# Patient Record
Sex: MALE | Race: WHITE | NOT HISPANIC OR LATINO
[De-identification: names, ages, dates, MRNs, and addresses within clinical notes are randomized per-mention and may not be internally consistent; named-entity substitution may affect disease eponyms.]

---

## 2021-06-09 ENCOUNTER — NON-APPOINTMENT (OUTPATIENT)
Age: 46
End: 2021-06-09

## 2021-06-09 PROBLEM — Z00.00 ENCOUNTER FOR PREVENTIVE HEALTH EXAMINATION: Status: ACTIVE | Noted: 2021-06-09

## 2021-06-10 ENCOUNTER — APPOINTMENT (OUTPATIENT)
Dept: INFECTIOUS DISEASE | Facility: CLINIC | Age: 46
End: 2021-06-10
Payer: MEDICAID

## 2021-06-10 ENCOUNTER — NON-APPOINTMENT (OUTPATIENT)
Age: 46
End: 2021-06-10

## 2021-06-10 ENCOUNTER — OUTPATIENT (OUTPATIENT)
Dept: OUTPATIENT SERVICES | Facility: HOSPITAL | Age: 46
LOS: 1 days | End: 2021-06-10

## 2021-06-10 VITALS
RESPIRATION RATE: 12 BRPM | WEIGHT: 142.5 LBS | SYSTOLIC BLOOD PRESSURE: 125 MMHG | BODY MASS INDEX: 19.3 KG/M2 | HEIGHT: 72 IN | OXYGEN SATURATION: 97 % | DIASTOLIC BLOOD PRESSURE: 89 MMHG | TEMPERATURE: 97.6 F | HEART RATE: 77 BPM

## 2021-06-10 DIAGNOSIS — Z86.69 PERSONAL HISTORY OF OTHER DISEASES OF THE NERVOUS SYSTEM AND SENSE ORGANS: ICD-10-CM

## 2021-06-10 DIAGNOSIS — Z87.39 PERSONAL HISTORY OF OTHER DISEASES OF THE MUSCULOSKELETAL SYSTEM AND CONNECTIVE TISSUE: ICD-10-CM

## 2021-06-10 DIAGNOSIS — F17.200 NICOTINE DEPENDENCE, UNSPECIFIED, UNCOMPLICATED: ICD-10-CM

## 2021-06-10 DIAGNOSIS — L84 CORNS AND CALLOSITIES: ICD-10-CM

## 2021-06-10 DIAGNOSIS — D69.6 THROMBOCYTOPENIA, UNSPECIFIED: ICD-10-CM

## 2021-06-10 DIAGNOSIS — G25.0 ESSENTIAL TREMOR: ICD-10-CM

## 2021-06-10 DIAGNOSIS — J45.909 UNSPECIFIED ASTHMA, UNCOMPLICATED: ICD-10-CM

## 2021-06-10 LAB
A1C WITH ESTIMATED AVERAGE GLUCOSE RESULT: 4.8 % — SIGNIFICANT CHANGE UP (ref 4–5.6)
ALBUMIN SERPL ELPH-MCNC: 4.3 G/DL — SIGNIFICANT CHANGE UP (ref 3.3–5)
ALP SERPL-CCNC: 130 U/L — HIGH (ref 40–120)
ALT FLD-CCNC: 214 U/L — HIGH (ref 10–45)
ANION GAP SERPL CALC-SCNC: 14 MMOL/L — SIGNIFICANT CHANGE UP (ref 5–17)
APPEARANCE UR: CLEAR — SIGNIFICANT CHANGE UP
AST SERPL-CCNC: 184 U/L — HIGH (ref 10–40)
BILIRUB SERPL-MCNC: 1 MG/DL — SIGNIFICANT CHANGE UP (ref 0.2–1.2)
BILIRUB UR-MCNC: ABNORMAL
BUN SERPL-MCNC: 19 MG/DL — SIGNIFICANT CHANGE UP (ref 7–23)
CALCIUM SERPL-MCNC: 9.9 MG/DL — SIGNIFICANT CHANGE UP (ref 8.4–10.5)
CHLORIDE SERPL-SCNC: 104 MMOL/L — SIGNIFICANT CHANGE UP (ref 96–108)
CHOLEST SERPL-MCNC: 163 MG/DL — SIGNIFICANT CHANGE UP
CO2 SERPL-SCNC: 24 MMOL/L — SIGNIFICANT CHANGE UP (ref 22–31)
COLOR SPEC: YELLOW — SIGNIFICANT CHANGE UP
CONTROL LINE: PRESENT
CREAT SERPL-MCNC: 0.69 MG/DL — SIGNIFICANT CHANGE UP (ref 0.5–1.3)
DIFF PNL FLD: NEGATIVE — SIGNIFICANT CHANGE UP
ESTIMATED AVERAGE GLUCOSE: 91 MG/DL — SIGNIFICANT CHANGE UP (ref 68–114)
GLUCOSE SERPL-MCNC: 88 MG/DL — SIGNIFICANT CHANGE UP (ref 70–99)
GLUCOSE UR QL: NEGATIVE — SIGNIFICANT CHANGE UP
HAV IGM SER-ACNC: SIGNIFICANT CHANGE UP
HBV CORE IGM SER-ACNC: SIGNIFICANT CHANGE UP
HBV SURFACE AG SER-ACNC: SIGNIFICANT CHANGE UP
HCT VFR BLD CALC: 45.4 % — SIGNIFICANT CHANGE UP (ref 39–50)
HCT VFR BLD CALC: 45.4 % — SIGNIFICANT CHANGE UP (ref 39–50)
HCV AB S/CO SERPL IA: 0.03 S/CO — SIGNIFICANT CHANGE UP
HCV AB SERPL-IMP: SIGNIFICANT CHANGE UP
HDLC SERPL-MCNC: 50 MG/DL — SIGNIFICANT CHANGE UP
HGB BLD-MCNC: 14.7 G/DL — SIGNIFICANT CHANGE UP (ref 13–17)
HGB BLD-MCNC: 15 G/DL — SIGNIFICANT CHANGE UP (ref 13–17)
HIV 1+2 AB+HIV1 P24 AG SERPL QL IA: SIGNIFICANT CHANGE UP
HIV 1+2 AB+HIV1P24 AG SERPLBLD IA.RAPID: NEGATIVE
HIV-1 / HIV-2 ANTIBODY: NORMAL
HIV1 P24 AG SERPL IA-MCNC: NEGATIVE
KETONES UR-MCNC: ABNORMAL MG/DL
LEUKOCYTE ESTERASE UR-ACNC: NEGATIVE — SIGNIFICANT CHANGE UP
LIPID PNL WITH DIRECT LDL SERPL: 97 MG/DL — SIGNIFICANT CHANGE UP
MCHC RBC-ENTMCNC: 32.4 GM/DL — SIGNIFICANT CHANGE UP (ref 32–36)
MCHC RBC-ENTMCNC: 33 GM/DL — SIGNIFICANT CHANGE UP (ref 32–36)
MCHC RBC-ENTMCNC: 33.3 PG — SIGNIFICANT CHANGE UP (ref 27–34)
MCHC RBC-ENTMCNC: 34.2 PG — HIGH (ref 27–34)
MCV RBC AUTO: 102.9 FL — HIGH (ref 80–100)
MCV RBC AUTO: 103.4 FL — HIGH (ref 80–100)
NITRITE UR-MCNC: NEGATIVE — SIGNIFICANT CHANGE UP
NON HDL CHOLESTEROL: 113 MG/DL — SIGNIFICANT CHANGE UP
NRBC # BLD: 0 /100 WBCS — SIGNIFICANT CHANGE UP (ref 0–0)
NRBC # BLD: 0 /100 WBCS — SIGNIFICANT CHANGE UP (ref 0–0)
PH UR: 5.5 — SIGNIFICANT CHANGE UP (ref 5–8)
PLATELET # BLD AUTO: 299 K/UL — SIGNIFICANT CHANGE UP (ref 150–400)
PLATELET # BLD AUTO: 311 K/UL — SIGNIFICANT CHANGE UP (ref 150–400)
POTASSIUM SERPL-MCNC: 4.5 MMOL/L — SIGNIFICANT CHANGE UP (ref 3.5–5.3)
POTASSIUM SERPL-SCNC: 4.5 MMOL/L — SIGNIFICANT CHANGE UP (ref 3.5–5.3)
PROT SERPL-MCNC: 7.7 G/DL — SIGNIFICANT CHANGE UP (ref 6–8.3)
PROT UR-MCNC: NEGATIVE MG/DL — SIGNIFICANT CHANGE UP
RBC # BLD: 4.39 M/UL — SIGNIFICANT CHANGE UP (ref 4.2–5.8)
RBC # BLD: 4.41 M/UL — SIGNIFICANT CHANGE UP (ref 4.2–5.8)
RBC # FLD: 12.8 % — SIGNIFICANT CHANGE UP (ref 10.3–14.5)
RBC # FLD: 12.8 % — SIGNIFICANT CHANGE UP (ref 10.3–14.5)
SODIUM SERPL-SCNC: 142 MMOL/L — SIGNIFICANT CHANGE UP (ref 135–145)
SP GR SPEC: >=1.03 — SIGNIFICANT CHANGE UP (ref 1–1.03)
TRIGL SERPL-MCNC: 82 MG/DL — SIGNIFICANT CHANGE UP
TSH SERPL-MCNC: 3.05 UIU/ML — SIGNIFICANT CHANGE UP (ref 0.27–4.2)
UROBILINOGEN FLD QL: 0.2 E.U./DL — SIGNIFICANT CHANGE UP
WBC # BLD: 8.14 K/UL — SIGNIFICANT CHANGE UP (ref 3.8–10.5)
WBC # BLD: 8.24 K/UL — SIGNIFICANT CHANGE UP (ref 3.8–10.5)
WBC # FLD AUTO: 8.14 K/UL — SIGNIFICANT CHANGE UP (ref 3.8–10.5)
WBC # FLD AUTO: 8.24 K/UL — SIGNIFICANT CHANGE UP (ref 3.8–10.5)

## 2021-06-10 PROCEDURE — 99204 OFFICE O/P NEW MOD 45 MIN: CPT | Mod: GC,25

## 2021-06-10 PROCEDURE — 99386 PREV VISIT NEW AGE 40-64: CPT | Mod: GC

## 2021-06-10 RX ORDER — MAGNESIUM OXIDE/MAG AA CHELATE 300 MG
300 CAPSULE ORAL
Refills: 0 | Status: ACTIVE | COMMUNITY
Start: 2021-06-10

## 2021-06-10 RX ORDER — CYANOCOBALAMIN 1000 UG/ML
1000 INJECTION INTRAMUSCULAR; SUBCUTANEOUS
Qty: 0 | Refills: 0 | Status: COMPLETED | OUTPATIENT
Start: 2021-06-10

## 2021-06-10 RX ADMIN — Medication 0 MCG/ML: at 00:00

## 2021-06-11 ENCOUNTER — NON-APPOINTMENT (OUTPATIENT)
Age: 46
End: 2021-06-11

## 2021-06-11 PROBLEM — Z87.39 HISTORY OF RIGHT FOOT DROP: Status: RESOLVED | Noted: 2021-06-10 | Resolved: 2021-06-11

## 2021-06-11 LAB
C TRACH RRNA SPEC QL NAA+PROBE: SIGNIFICANT CHANGE UP
COVID-19 NUCLEOCAPSID GAM AB INTERP: NEGATIVE — SIGNIFICANT CHANGE UP
COVID-19 NUCLEOCAPSID TOTAL GAM ANTIBODY RESULT: 0.11 INDEX — SIGNIFICANT CHANGE UP
COVID-19 SPIKE DOMAIN AB INTERP: POSITIVE
COVID-19 SPIKE DOMAIN ANTIBODY RESULT: 122 U/ML — HIGH
FOLATE SERPL-MCNC: >20 NG/ML — SIGNIFICANT CHANGE UP
N GONORRHOEA RRNA SPEC QL NAA+PROBE: SIGNIFICANT CHANGE UP
SARS-COV-2 IGG+IGM SERPL QL IA: 0.11 INDEX — SIGNIFICANT CHANGE UP
SARS-COV-2 IGG+IGM SERPL QL IA: 122 U/ML — HIGH
SARS-COV-2 IGG+IGM SERPL QL IA: NEGATIVE — SIGNIFICANT CHANGE UP
SARS-COV-2 IGG+IGM SERPL QL IA: POSITIVE
T PALLIDUM AB TITR SER: NEGATIVE — SIGNIFICANT CHANGE UP
VIT B12 SERPL-MCNC: 1768 PG/ML — HIGH (ref 232–1245)

## 2021-06-12 LAB
GAMMA INTERFERON BACKGROUND BLD IA-ACNC: 0.07 IU/ML — SIGNIFICANT CHANGE UP
M TB IFN-G BLD-IMP: NEGATIVE — SIGNIFICANT CHANGE UP
M TB IFN-G CD4+ BCKGRND COR BLD-ACNC: -0.01 IU/ML — SIGNIFICANT CHANGE UP
M TB IFN-G CD4+CD8+ BCKGRND COR BLD-ACNC: -0.01 IU/ML — SIGNIFICANT CHANGE UP
QUANT TB PLUS MITOGEN MINUS NIL: 7.44 IU/ML — SIGNIFICANT CHANGE UP

## 2021-06-14 DIAGNOSIS — G25.0 ESSENTIAL TREMOR: ICD-10-CM

## 2021-06-14 DIAGNOSIS — R74.01 ELEVATION OF LEVELS OF LIVER TRANSAMINASE LEVELS: ICD-10-CM

## 2021-06-14 DIAGNOSIS — Z11.4 ENCOUNTER FOR SCREENING FOR HUMAN IMMUNODEFICIENCY VIRUS [HIV]: ICD-10-CM

## 2021-06-14 DIAGNOSIS — Z00.00 ENCOUNTER FOR GENERAL ADULT MEDICAL EXAMINATION WITHOUT ABNORMAL FINDINGS: ICD-10-CM

## 2021-06-14 DIAGNOSIS — M21.371 FOOT DROP, RIGHT FOOT: ICD-10-CM

## 2021-06-14 DIAGNOSIS — Z72.52 HIGH RISK HOMOSEXUAL BEHAVIOR: ICD-10-CM

## 2021-06-14 DIAGNOSIS — F10.10 ALCOHOL ABUSE, UNCOMPLICATED: ICD-10-CM

## 2021-06-14 DIAGNOSIS — J45.909 UNSPECIFIED ASTHMA, UNCOMPLICATED: ICD-10-CM

## 2021-06-14 DIAGNOSIS — D69.6 THROMBOCYTOPENIA, UNSPECIFIED: ICD-10-CM

## 2021-06-14 DIAGNOSIS — M21.611 BUNION OF RIGHT FOOT: ICD-10-CM

## 2021-06-14 DIAGNOSIS — H52.10 MYOPIA, UNSPECIFIED EYE: ICD-10-CM

## 2021-06-22 ENCOUNTER — APPOINTMENT (OUTPATIENT)
Dept: NEUROLOGY | Facility: CLINIC | Age: 46
End: 2021-06-22

## 2021-06-24 ENCOUNTER — APPOINTMENT (OUTPATIENT)
Dept: INFECTIOUS DISEASE | Facility: CLINIC | Age: 46
End: 2021-06-24
Payer: MEDICAID

## 2021-06-24 ENCOUNTER — TRANSCRIPTION ENCOUNTER (OUTPATIENT)
Age: 46
End: 2021-06-24

## 2021-06-24 ENCOUNTER — OUTPATIENT (OUTPATIENT)
Dept: OUTPATIENT SERVICES | Facility: HOSPITAL | Age: 46
LOS: 1 days | End: 2021-06-24

## 2021-06-24 VITALS
HEIGHT: 72 IN | HEART RATE: 78 BPM | WEIGHT: 143.2 LBS | BODY MASS INDEX: 19.39 KG/M2 | SYSTOLIC BLOOD PRESSURE: 110 MMHG | OXYGEN SATURATION: 96 % | DIASTOLIC BLOOD PRESSURE: 74 MMHG

## 2021-06-24 DIAGNOSIS — Z92.29 PERSONAL HISTORY OF OTHER DRUG THERAPY: ICD-10-CM

## 2021-06-24 DIAGNOSIS — H52.10 MYOPIA, UNSPECIFIED EYE: ICD-10-CM

## 2021-06-24 LAB
ALBUMIN SERPL ELPH-MCNC: 4.6 G/DL — SIGNIFICANT CHANGE UP (ref 3.3–5)
ALP SERPL-CCNC: 97 U/L — SIGNIFICANT CHANGE UP (ref 40–120)
ALT FLD-CCNC: 53 U/L — HIGH (ref 10–45)
ANION GAP SERPL CALC-SCNC: 15 MMOL/L — SIGNIFICANT CHANGE UP (ref 5–17)
AST SERPL-CCNC: 41 U/L — HIGH (ref 10–40)
BASOPHILS # BLD AUTO: 0.1 K/UL — SIGNIFICANT CHANGE UP (ref 0–0.2)
BASOPHILS NFR BLD AUTO: 1.3 % — SIGNIFICANT CHANGE UP (ref 0–2)
BILIRUB SERPL-MCNC: 1.2 MG/DL — SIGNIFICANT CHANGE UP (ref 0.2–1.2)
BUN SERPL-MCNC: 18 MG/DL — SIGNIFICANT CHANGE UP (ref 7–23)
CALCIUM SERPL-MCNC: 9.4 MG/DL — SIGNIFICANT CHANGE UP (ref 8.4–10.5)
CHLORIDE SERPL-SCNC: 101 MMOL/L — SIGNIFICANT CHANGE UP (ref 96–108)
CO2 SERPL-SCNC: 23 MMOL/L — SIGNIFICANT CHANGE UP (ref 22–31)
CREAT SERPL-MCNC: 0.69 MG/DL — SIGNIFICANT CHANGE UP (ref 0.5–1.3)
EOSINOPHIL # BLD AUTO: 0.34 K/UL — SIGNIFICANT CHANGE UP (ref 0–0.5)
EOSINOPHIL NFR BLD AUTO: 4.3 % — SIGNIFICANT CHANGE UP (ref 0–6)
GLUCOSE SERPL-MCNC: 73 MG/DL — SIGNIFICANT CHANGE UP (ref 70–99)
HCT VFR BLD CALC: 43.6 % — SIGNIFICANT CHANGE UP (ref 39–50)
HGB BLD-MCNC: 14.2 G/DL — SIGNIFICANT CHANGE UP (ref 13–17)
IMM GRANULOCYTES NFR BLD AUTO: 0.3 % — SIGNIFICANT CHANGE UP (ref 0–1.5)
LYMPHOCYTES # BLD AUTO: 3 K/UL — SIGNIFICANT CHANGE UP (ref 1–3.3)
LYMPHOCYTES # BLD AUTO: 37.7 % — SIGNIFICANT CHANGE UP (ref 13–44)
MCHC RBC-ENTMCNC: 32.6 GM/DL — SIGNIFICANT CHANGE UP (ref 32–36)
MCHC RBC-ENTMCNC: 33.5 PG — SIGNIFICANT CHANGE UP (ref 27–34)
MCV RBC AUTO: 102.8 FL — HIGH (ref 80–100)
MONOCYTES # BLD AUTO: 0.69 K/UL — SIGNIFICANT CHANGE UP (ref 0–0.9)
MONOCYTES NFR BLD AUTO: 8.7 % — SIGNIFICANT CHANGE UP (ref 2–14)
NEUTROPHILS # BLD AUTO: 3.81 K/UL — SIGNIFICANT CHANGE UP (ref 1.8–7.4)
NEUTROPHILS NFR BLD AUTO: 47.7 % — SIGNIFICANT CHANGE UP (ref 43–77)
NRBC # BLD: 0 /100 WBCS — SIGNIFICANT CHANGE UP (ref 0–0)
PLATELET # BLD AUTO: 301 K/UL — SIGNIFICANT CHANGE UP (ref 150–400)
POTASSIUM SERPL-MCNC: 4.8 MMOL/L — SIGNIFICANT CHANGE UP (ref 3.5–5.3)
POTASSIUM SERPL-SCNC: 4.8 MMOL/L — SIGNIFICANT CHANGE UP (ref 3.5–5.3)
PROT SERPL-MCNC: 7.6 G/DL — SIGNIFICANT CHANGE UP (ref 6–8.3)
RBC # BLD: 4.24 M/UL — SIGNIFICANT CHANGE UP (ref 4.2–5.8)
RBC # FLD: 12 % — SIGNIFICANT CHANGE UP (ref 10.3–14.5)
SODIUM SERPL-SCNC: 139 MMOL/L — SIGNIFICANT CHANGE UP (ref 135–145)
WBC # BLD: 7.96 K/UL — SIGNIFICANT CHANGE UP (ref 3.8–10.5)
WBC # FLD AUTO: 7.96 K/UL — SIGNIFICANT CHANGE UP (ref 3.8–10.5)

## 2021-06-24 PROCEDURE — 99214 OFFICE O/P EST MOD 30 MIN: CPT | Mod: GC

## 2021-06-24 RX ORDER — NICOTINE 21 MG/24HR
21 PATCH, TRANSDERMAL 24 HOURS TRANSDERMAL DAILY
Qty: 14 | Refills: 3 | Status: DISCONTINUED | COMMUNITY
Start: 2021-06-10 | End: 2021-06-24

## 2021-06-24 RX ORDER — NICOTINE POLACRILEX 4 MG/1
4 GUM, CHEWING ORAL
Qty: 14 | Refills: 2 | Status: DISCONTINUED | COMMUNITY
Start: 2021-06-10 | End: 2021-06-24

## 2021-06-25 LAB
HAV IGG SER QL IA: SIGNIFICANT CHANGE UP
HAV IGM SER-ACNC: SIGNIFICANT CHANGE UP
HBV SURFACE AB SER-ACNC: REACTIVE

## 2021-06-25 NOTE — HISTORY OF PRESENT ILLNESS
[FreeTextEntry1] : prep followup and foot drop [de-identified] : 47 y/o M PMH etoh abuse (sober x 3 weeks), active smoker (slightly less than 1 ppd), MSM (started on descovy for prep at last visit), asthma, essential tremor who presents for followup. Patient was requested to come back today as he was noted to have elevated lfts at last visit which were slightly increased from when he was in the [facility] ER. Patient has no ruq pain and has not been drinking. He is attending AA via zoom. He has not had any sexual encounters since started descovy. Reports no side effects from descovy. Still smoking about 1 ppd but has cut down slightly. Would like to focus on sobriety before tackling his smoking. Patient has noted some improvement in his foot drop and can know lift his foot off the ground. Patient has not yet seen ophtho (f/u on b/l staphyloma), podiatry (f/u on bunion and callus,, or neurology (f/u on foot drop).

## 2021-06-25 NOTE — REVIEW OF SYSTEMS
[Negative] : Integumentary [Headache] : no headache [Dizziness] : no dizziness [Confusion] : no confusion [de-identified] : +right foot drop

## 2021-06-25 NOTE — ASSESSMENT
[FreeTextEntry1] : #HCM - eligible for tdap vaccine but would like to defer for now\par \par RTC in 2.5 months (3 months after starting descovy 6/10/21)

## 2021-06-25 NOTE — PHYSICAL EXAM
[Supple] : supple [Soft] : abdomen soft [Non Tender] : non-tender [Non-distended] : non-distended [No Masses] : no abdominal mass palpated [No HSM] : no HSM [Normal] : affect was normal and insight and judgment were intact [de-identified] : +right foot dorsiflexion 3/5, no other motor or sensory deficits noted, CN 2-12 intact

## 2021-06-25 NOTE — END OF VISIT
[] : Resident [FreeTextEntry3] : I saw and evaluated the patient with resident. I reviewed the laboratory and radiologic data and reviewed the notes. 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

## 2021-06-28 DIAGNOSIS — R10.10 UPPER ABDOMINAL PAIN, UNSPECIFIED: ICD-10-CM

## 2021-06-28 DIAGNOSIS — Z72.52 HIGH RISK HOMOSEXUAL BEHAVIOR: ICD-10-CM

## 2021-06-28 DIAGNOSIS — M21.371 FOOT DROP, RIGHT FOOT: ICD-10-CM

## 2021-06-28 DIAGNOSIS — R74.01 ELEVATION OF LEVELS OF LIVER TRANSAMINASE LEVELS: ICD-10-CM

## 2021-06-28 DIAGNOSIS — H52.10 MYOPIA, UNSPECIFIED EYE: ICD-10-CM

## 2021-06-28 LAB
HCV RNA SERPL NAA DL=5-ACNC: SIGNIFICANT CHANGE UP IU/ML
HCV RNA SPEC NAA+PROBE-LOG IU: SIGNIFICANT CHANGE UP LOG10IU/ML

## 2021-07-08 ENCOUNTER — RX RENEWAL (OUTPATIENT)
Age: 46
End: 2021-07-08

## 2021-07-15 ENCOUNTER — APPOINTMENT (OUTPATIENT)
Dept: INFECTIOUS DISEASE | Facility: CLINIC | Age: 46
End: 2021-07-15

## 2021-08-12 ENCOUNTER — APPOINTMENT (OUTPATIENT)
Dept: NEUROLOGY | Facility: CLINIC | Age: 46
End: 2021-08-12
Payer: MEDICAID

## 2021-08-12 VITALS
HEIGHT: 72 IN | BODY MASS INDEX: 19.23 KG/M2 | OXYGEN SATURATION: 94 % | HEART RATE: 82 BPM | SYSTOLIC BLOOD PRESSURE: 115 MMHG | DIASTOLIC BLOOD PRESSURE: 77 MMHG | TEMPERATURE: 96.6 F | WEIGHT: 142 LBS

## 2021-08-12 PROCEDURE — 99205 OFFICE O/P NEW HI 60 MIN: CPT

## 2021-08-12 NOTE — DISCUSSION/SUMMARY
[FreeTextEntry1] : I will send him for nerve conduction studies and EMG.  I doubt that he has a radicular origin for his injury but I would like to establish the certainty of his peroneal nerve injury.\par I will also send him for physical therapy.\par He is already taking vitamins and he is eating normal.  His B12 folate etc. were all normal according to his GP.\par Discussed possible etiologies including alcohol which may have precipitated a injury due to compression.

## 2021-08-12 NOTE — PROCEDURE
[FreeTextEntry1] : 46-year-old man with a history of alcohol abuse who presents with a 2-month history of right foot drop.  The examination is consistent with possible peroneal nerve injury.  He has primarily dorsiflexor weakness.  He reports minor sensory changes along the distribution of the peroneal nerve.

## 2021-08-12 NOTE — PHYSICAL EXAM
[Place] : oriented to place [Time] : oriented to time [Naming Objects] : no difficulty naming common objects [Repeating Phrases] : no difficulty repeating a phrase [Fluency] : fluency intact [Comprehension] : comprehension intact [Current Events] : adequate knowledge of current events [Cranial Nerves Optic (II)] : visual acuity intact bilaterally,  visual fields full to confrontation, pupils equal round and reactive to light [Cranial Nerves Oculomotor (III)] : extraocular motion intact [Cranial Nerves Vestibulocochlear (VIII)] : hearing was intact bilaterally [Motor Handedness Right-Handed] : the patient is right hand dominant [Motor Strength Shoulders Right Weakness] : normal shoulder strength [Motor Strength Upper Extremities Right] : normal arm strength [Motor Strength Forearms Right Weakness] : normal forearm strength [Motor Strength Wrists Right Weakness] : normal wrist strength [Hand Weakness Right] : normal hand  [Motor Strength Fingers Right Hand Weakness] : normal finger strength [Motor Strength Thumb Right Weakness] : normal thumb strength [Motor Strength Shoulders Left Weakness] : normal shoulder strength [Motor Strength Upper Extremities Left] : normal arm strength [Motor Strength Forearms Left Weakness] : normal forearm strength [Motor Strength Wrists Left Weakness] : normal wrist strength [Hand Weakness Left] : normal hand  [Motor Strength Fingers Left Hand Weakness] : normal finger strength [Motor Strength Thumb Left Weakness] : normal thumb strength [Motor Strength Hips Right Weakness] : normal hip strength [Motor Strength Knee Right Weakness] : normal knee strength [Motor Strength Ankle Right Weakness] : ankle weakness was present [5] : toe extension 5/5 [3] : great toe extension 3/5 [Motor Strength Hips Left Weakness] : normal hip strength [Motor Strength Knee Left Weakness] : normal knee strength [Motor Strength Ankle Left Weakness] : normal ankle strength [Motor Strength Toes Left Foot Weakness] : normal toe strength [Motor Strength First Toe Left Weakness] : normal great toe strength [Limited Balance] : the patient's balance was impaired [2+] : Ankle jerk left 2+ [FreeTextEntry7] : light sensory asymm w R food ext

## 2021-08-12 NOTE — HISTORY OF PRESENT ILLNESS
[FreeTextEntry1] : This is the first visit of this 46-year-old white right-handed man who was referred by GP for right foot drop.\par \par The patient past medical history is characterized by alcohol abuse, asthma and previous hospitalizations for alcohol abuse.  He also has a history of mild no or essential tremor.\par Please see chart for details regarding his other medical conditions.\par \par The patient reports that he woke up one day around June 2021 with the inability to move his right foot.  At first he reports that he could not move the toes and had a major limp.  Over the past month however his symptoms have improved to the point that he cannot walk with impairment.  He denies clear sensory changes associated with his weakness but reports that this sensation on the outer part of the left right foot is may be decreased compared to the left.\par He has no back pain or urinary incontinence.  He reports no injuries to the pelvis or to any other areas in the past.\par He has a history of alcohol abuse drinking about a pint of vodka a day but he quit this year.  He reports that he is not drinking anymore.\par He has completed his vaccines back in the spring.\par He reports no pain but reports that sometimes he has some tingling and numbness of his feet.  He reports no symptoms in his upper extremities.

## 2021-09-15 ENCOUNTER — NON-APPOINTMENT (OUTPATIENT)
Age: 46
End: 2021-09-15

## 2021-09-20 ENCOUNTER — NON-APPOINTMENT (OUTPATIENT)
Age: 46
End: 2021-09-20

## 2021-09-21 ENCOUNTER — OUTPATIENT (OUTPATIENT)
Dept: OUTPATIENT SERVICES | Facility: HOSPITAL | Age: 46
LOS: 1 days | End: 2021-09-21

## 2021-09-21 ENCOUNTER — APPOINTMENT (OUTPATIENT)
Dept: INFECTIOUS DISEASE | Facility: CLINIC | Age: 46
End: 2021-09-21
Payer: MEDICAID

## 2021-09-21 VITALS
WEIGHT: 143.6 LBS | SYSTOLIC BLOOD PRESSURE: 108 MMHG | DIASTOLIC BLOOD PRESSURE: 78 MMHG | RESPIRATION RATE: 14 BRPM | BODY MASS INDEX: 19.48 KG/M2 | OXYGEN SATURATION: 99 % | HEART RATE: 92 BPM | TEMPERATURE: 97.8 F

## 2021-09-21 DIAGNOSIS — Z00.00 ENCOUNTER FOR GENERAL ADULT MEDICAL EXAMINATION W/OUT ABNORMAL FINDINGS: ICD-10-CM

## 2021-09-21 LAB
CONTROL LINE: PRESENT
HIV 1+2 AB+HIV1P24 AG SERPLBLD IA.RAPID: NEGATIVE
HIV-1 / HIV-2 ANTIBODY: NORMAL
HIV1 P24 AG SERPL IA-MCNC: NEGATIVE

## 2021-09-21 PROCEDURE — 99214 OFFICE O/P EST MOD 30 MIN: CPT | Mod: GC

## 2021-09-22 NOTE — HISTORY OF PRESENT ILLNESS
[Post-hospitalization from ___ Hospital] : Post-hospitalization from [unfilled] Hospital [FreeTextEntry2] : 46M with PMH of EtOH abuse, MSM (on Descovy), asthma, essential tremor presents for f/u after hospital discharge. He reports binge drinking recently before trying to quit cold turkey. He started to have tremors and visual hallucinations at home. He was admitted to [facility] on 9/14 and discharged after 4 days. He was discharged on Naltrexone PO which he has been taking since yesterday. He also has joined an outpatient rehabilitation program through [facility] and has been attending sessions with a counselor.He is motivated to quit. He has not consumed alcohol since being discharged and reports his foot drop is also resolved. He ran out of [employer] and has missed approximately 1 week of pills. He has not been sexually active in the past month. He does report some increased anxiety with racing thoughts at night. He feels his anxiety makes his essential tremors worse. He denies panic attacks, fevers, chills.

## 2021-09-22 NOTE — PHYSICAL EXAM
[No Lymphadenopathy] : no lymphadenopathy [Speech Grossly Normal] : speech grossly normal [Normal] : no posterior cervical lymphadenopathy and no anterior cervical lymphadenopathy [Memory Grossly Normal] : memory grossly normal [de-identified] : mild resting and essential tremor present in hands

## 2021-09-22 NOTE — END OF VISIT
[] : Resident [FreeTextEntry3] : I saw and evaluated the patient with resident. I reviewed the laboratory and radiologic data and reviewed the notes. \par I performed a medication reconciliation and  reviewed vaccination history and other health care maintenance and prevention topics in the EMR.\par I assessed patient's adherence to medications especially ARVs ( % 100 ) and inquired about AEs ( None).\par \par \par \par FIT testing\par \par Continue PrEP\par POC HIV test negative

## 2021-09-22 NOTE — ASSESSMENT
[FreeTextEntry1] : 46M with PMH of EtOH abuse, MSM (on Descovy), asthma, essential tremor presents for f/u after recent hospital admission for alcohol withdrawal.\par \par #EtOH abuse - pt reports not drinking since discharge. He is currently on Naltrexone and in an outpatient rehab.\par - c/w Naltrexone PO\par - c/w outpatient rehab and counseling \par - will recheck CMP on following visit\par - Pt will fax/email records from [facility]\par \par #MSM on PrEP - Pt says he has not been sexually active for the past month\par - POCT HIV test negative in office today 9/21\par - Pt educated about safe sex practices while restarting Descovy\par - c/w Descovy\par - RTC in 3 months for further testing\par - Previous triple site test and syphilis was negative, hepatitis panel negative\par \par #Anxiety\par - Pt advised to f/u with counselor at outpatient rehab program he is currently in\par - Pt educated about meditation and guided meditation apps to decrease racing thoughts\par \par #HCM\par - Pt given FIT test to complete \par - Pt due for Tdap but he will do it during next visit\par - Pt due for flu vaccine\par - COVID vaccine UTD\par \par RTC in 3 months

## 2021-09-27 DIAGNOSIS — Z79.899 OTHER LONG TERM (CURRENT) DRUG THERAPY: ICD-10-CM

## 2021-09-27 DIAGNOSIS — Z12.11 ENCOUNTER FOR SCREENING FOR MALIGNANT NEOPLASM OF COLON: ICD-10-CM

## 2021-09-27 DIAGNOSIS — F10.10 ALCOHOL ABUSE, UNCOMPLICATED: ICD-10-CM

## 2021-09-27 DIAGNOSIS — F41.9 ANXIETY DISORDER, UNSPECIFIED: ICD-10-CM

## 2021-10-04 ENCOUNTER — NON-APPOINTMENT (OUTPATIENT)
Age: 46
End: 2021-10-04

## 2021-10-04 ENCOUNTER — RX RENEWAL (OUTPATIENT)
Age: 46
End: 2021-10-04

## 2021-10-07 ENCOUNTER — OUTPATIENT (OUTPATIENT)
Dept: OUTPATIENT SERVICES | Facility: HOSPITAL | Age: 46
LOS: 1 days | End: 2021-10-07

## 2021-10-07 ENCOUNTER — APPOINTMENT (OUTPATIENT)
Dept: INFECTIOUS DISEASE | Facility: CLINIC | Age: 46
End: 2021-10-07

## 2021-10-07 RX ORDER — ASPIRIN 81 MG/1
81 TABLET ORAL
Refills: 0 | Status: DISCONTINUED | COMMUNITY
Start: 2021-06-10 | End: 2021-10-07

## 2021-10-12 ENCOUNTER — APPOINTMENT (OUTPATIENT)
Dept: INFECTIOUS DISEASE | Facility: CLINIC | Age: 46
End: 2021-10-12
Payer: MEDICAID

## 2021-10-12 ENCOUNTER — OUTPATIENT (OUTPATIENT)
Dept: OUTPATIENT SERVICES | Facility: HOSPITAL | Age: 46
LOS: 1 days | End: 2021-10-12

## 2021-10-12 VITALS
RESPIRATION RATE: 13 BRPM | TEMPERATURE: 98.6 F | HEIGHT: 72 IN | BODY MASS INDEX: 20.09 KG/M2 | WEIGHT: 148.3 LBS | HEART RATE: 75 BPM | DIASTOLIC BLOOD PRESSURE: 75 MMHG | OXYGEN SATURATION: 98 % | SYSTOLIC BLOOD PRESSURE: 110 MMHG

## 2021-10-12 DIAGNOSIS — R74.01 ELEVATION OF LEVELS OF LIVER TRANSAMINASE LEVELS: ICD-10-CM

## 2021-10-12 DIAGNOSIS — Z12.11 ENCOUNTER FOR SCREENING FOR MALIGNANT NEOPLASM OF COLON: ICD-10-CM

## 2021-10-12 LAB
ALBUMIN SERPL ELPH-MCNC: 4.8 G/DL — SIGNIFICANT CHANGE UP (ref 3.3–5)
ALP SERPL-CCNC: 91 U/L — SIGNIFICANT CHANGE UP (ref 40–120)
ALT FLD-CCNC: 34 U/L — SIGNIFICANT CHANGE UP (ref 10–45)
ANION GAP SERPL CALC-SCNC: 10 MMOL/L — SIGNIFICANT CHANGE UP (ref 5–17)
AST SERPL-CCNC: 26 U/L — SIGNIFICANT CHANGE UP (ref 10–40)
BILIRUB SERPL-MCNC: 1 MG/DL — SIGNIFICANT CHANGE UP (ref 0.2–1.2)
BUN SERPL-MCNC: 19 MG/DL — SIGNIFICANT CHANGE UP (ref 7–23)
CALCIUM SERPL-MCNC: 10.3 MG/DL — SIGNIFICANT CHANGE UP (ref 8.4–10.5)
CHLORIDE SERPL-SCNC: 103 MMOL/L — SIGNIFICANT CHANGE UP (ref 96–108)
CK SERPL-CCNC: 70 U/L — SIGNIFICANT CHANGE UP (ref 30–200)
CO2 SERPL-SCNC: 28 MMOL/L — SIGNIFICANT CHANGE UP (ref 22–31)
CREAT SERPL-MCNC: 0.71 MG/DL — SIGNIFICANT CHANGE UP (ref 0.5–1.3)
GLUCOSE SERPL-MCNC: 84 MG/DL — SIGNIFICANT CHANGE UP (ref 70–99)
POTASSIUM SERPL-MCNC: 4.8 MMOL/L — SIGNIFICANT CHANGE UP (ref 3.5–5.3)
POTASSIUM SERPL-SCNC: 4.8 MMOL/L — SIGNIFICANT CHANGE UP (ref 3.5–5.3)
PROT SERPL-MCNC: 8.1 G/DL — SIGNIFICANT CHANGE UP (ref 6–8.3)
SODIUM SERPL-SCNC: 141 MMOL/L — SIGNIFICANT CHANGE UP (ref 135–145)

## 2021-10-12 PROCEDURE — 99214 OFFICE O/P EST MOD 30 MIN: CPT | Mod: GC

## 2021-10-14 DIAGNOSIS — Z12.11 ENCOUNTER FOR SCREENING FOR MALIGNANT NEOPLASM OF COLON: ICD-10-CM

## 2021-10-14 DIAGNOSIS — Z23 ENCOUNTER FOR IMMUNIZATION: ICD-10-CM

## 2021-10-14 DIAGNOSIS — M21.611 BUNION OF RIGHT FOOT: ICD-10-CM

## 2021-10-14 DIAGNOSIS — F10.10 ALCOHOL ABUSE, UNCOMPLICATED: ICD-10-CM

## 2021-10-14 DIAGNOSIS — R74.01 ELEVATION OF LEVELS OF LIVER TRANSAMINASE LEVELS: ICD-10-CM

## 2021-10-15 ENCOUNTER — TRANSCRIPTION ENCOUNTER (OUTPATIENT)
Age: 46
End: 2021-10-15

## 2021-10-22 ENCOUNTER — RX RENEWAL (OUTPATIENT)
Age: 46
End: 2021-10-22

## 2021-11-18 ENCOUNTER — APPOINTMENT (OUTPATIENT)
Dept: NEUROLOGY | Facility: CLINIC | Age: 46
End: 2021-11-18
Payer: MEDICAID

## 2021-11-18 VITALS
TEMPERATURE: 95.5 F | WEIGHT: 145 LBS | HEART RATE: 99 BPM | BODY MASS INDEX: 19.64 KG/M2 | DIASTOLIC BLOOD PRESSURE: 75 MMHG | SYSTOLIC BLOOD PRESSURE: 109 MMHG | HEIGHT: 72 IN | OXYGEN SATURATION: 99 %

## 2021-11-18 DIAGNOSIS — M21.371 FOOT DROP, RIGHT FOOT: ICD-10-CM

## 2021-11-18 PROCEDURE — 95911 NRV CNDJ TEST 9-10 STUDIES: CPT

## 2021-11-18 PROCEDURE — 99214 OFFICE O/P EST MOD 30 MIN: CPT | Mod: 25

## 2021-11-18 PROCEDURE — 95885 MUSC TST DONE W/NERV TST LIM: CPT

## 2021-11-18 NOTE — PROCEDURE
[FreeTextEntry1] : \par Nerve Conduction and Electromyography Report\par  [FreeTextEntry3] : \par Electro Physiologic Findings:\par \par Limb temperature was monitored and maintained at approximately 30 – 34° C in the lower extremities.\par \par The right sural sensory response was low amplitude, with normal velocity. The superficial fibular sensory responses were absent bilaterally. The right fibular motor response at the EDB was low amplitude, with mild slowing in the lower leg and moderate slowing across the knee, without conduction block. The right fibular motor response at the tibialis anterior was normal amplitude, with moderate slowing across the knee and a suggestion of mild (30%) conduction block. The left fibular motor response at the EDB had a borderline amplitude with normal velocity; at the tibialis anterior it was normal. The tibial motor amplitudes were mildly low bilaterally with normal latencies. The tibial and fibular F-wave latencies were prolonged bilaterally. \par \par Needle electromyography was performed on the right tibialis anterior, which demonstrated mild chronic denervation; and the right peroneus longus, which was unremarkable, although evaluation was limited by poor patient tolerance. \par \par Clinical Electrophysiological Impression: \par \par This electrodiagnostic study demonstrated a chronic right common fibular (peroneal) neuropathy, with possible mild conduction block and mild distal motor axon loss. \par \par There was also evidence of a mild, predominantly axonal sensorimotor polyneuropathy, which, given the history provided, is likely related to alcohol use and/or nutritional deficiencies.

## 2021-11-18 NOTE — PHYSICAL EXAM
[FreeTextEntry1] : Neurological Examination: \par Motor examination:  Upper Extremities: L 5/5, R 5/5; Lower extremities (toe extension): L 4/5, R 4/5, otherwise intact.  Mild atrophy of right EDB\par Sensory examination:   Moderate decrease in vibration at toes, mildly decreased at lateral maleolus. Length dependent decrease to pinprick.\par Reflexes:   2+ b/l biceps, triceps, brachioradialis, patella. 1+ left achilles, absent right achilles.

## 2021-11-18 NOTE — HISTORY OF PRESENT ILLNESS
[FreeTextEntry1] : Patient was referred by Dr. Connor for evaluation of right foot drop.\par Symptoms started in June 2021 after spending a prolonged period down on his right side after falling down (in the setting of alcohol intoxication). \par \par Today he reports that the strength in his right dorsiflexion has improved dramatically (90% of baseline), but now he reports some mild numbness in the medial aspects of both LEs. He says he has stopped drinking heavily and believes the resolution of his foot drop can be attributed to that. He is unsure how long he has had the numbness, but believes onset occurred after the onset of foot drop. \par \par Reviewed:\par discharge summary from [facility]\par notes from neurology, infectious disease / internal medicine\par labs - MCV elevated 110, AST and ALT elevated

## 2021-11-18 NOTE — ASSESSMENT
[FreeTextEntry1] : Right foot drop due to common fibular (peroneal) neuropathy due to prolonged compression in the setting of alcohol intoxication, now largely recovered \par NCS/EMG demonstrated a right common fibular neuropathy, now chronic with reinnervation changes\par Also a mild axonal sensorimotor polyneuropathy likely due to alcohol and/or nutritional deficiencies\par He is currently taking thiamine, folic acid, and vitamin B12; levels were not reported in the outside records available for review, however I would recommend that he continue these supplements for the time being\par \par See separate procedure note for full results of NCS/EMG study

## 2021-11-18 NOTE — HISTORY OF PRESENT ILLNESS
[FreeTextEntry1] : Patient was referred by Dr. Connor for evaluation of right foot drop.\par Symptoms started in June 2021 after spending a prolonged period down on his right side after falling down (in the setting of alcohol intoxication). \par \par Today he reports that the strength in his right dorsiflexion has improved dramatically (90% of baseline), but now he reports some mild numbness in the medial aspects of both LEs. He says he has stopped drinking heavily and believes the resolution of his foot drop can be attributed to that. He is unsure how long he has had the numbness, but believes onset occurred after the onset of foot drop. \par \par Reviewed:\par discharge summary from [facility]\par notes from neurology, infectious disease / internal medicine\par labs - MCV elevated 110, AST and ALT elevated

## 2021-12-13 ENCOUNTER — NON-APPOINTMENT (OUTPATIENT)
Age: 46
End: 2021-12-13

## 2021-12-21 ENCOUNTER — OUTPATIENT (OUTPATIENT)
Dept: OUTPATIENT SERVICES | Facility: HOSPITAL | Age: 46
LOS: 1 days | End: 2021-12-21

## 2021-12-21 ENCOUNTER — APPOINTMENT (OUTPATIENT)
Dept: INFECTIOUS DISEASE | Facility: CLINIC | Age: 46
End: 2021-12-21
Payer: MEDICAID

## 2021-12-21 VITALS
HEART RATE: 90 BPM | OXYGEN SATURATION: 96 % | HEIGHT: 72 IN | DIASTOLIC BLOOD PRESSURE: 77 MMHG | TEMPERATURE: 98.5 F | WEIGHT: 150.06 LBS | BODY MASS INDEX: 20.33 KG/M2 | RESPIRATION RATE: 16 BRPM | SYSTOLIC BLOOD PRESSURE: 126 MMHG

## 2021-12-21 LAB
ALBUMIN SERPL ELPH-MCNC: 4.8 G/DL — SIGNIFICANT CHANGE UP (ref 3.3–5)
ALP SERPL-CCNC: 83 U/L — SIGNIFICANT CHANGE UP (ref 40–120)
ALT FLD-CCNC: 27 U/L — SIGNIFICANT CHANGE UP (ref 10–45)
ANION GAP SERPL CALC-SCNC: 12 MMOL/L — SIGNIFICANT CHANGE UP (ref 5–17)
AST SERPL-CCNC: 27 U/L — SIGNIFICANT CHANGE UP (ref 10–40)
BASOPHILS # BLD AUTO: 0.08 K/UL — SIGNIFICANT CHANGE UP (ref 0–0.2)
BASOPHILS NFR BLD AUTO: 1.4 % — SIGNIFICANT CHANGE UP (ref 0–2)
BILIRUB SERPL-MCNC: 0.3 MG/DL — SIGNIFICANT CHANGE UP (ref 0.2–1.2)
BUN SERPL-MCNC: 13 MG/DL — SIGNIFICANT CHANGE UP (ref 7–23)
CALCIUM SERPL-MCNC: 9.8 MG/DL — SIGNIFICANT CHANGE UP (ref 8.4–10.5)
CHLORIDE SERPL-SCNC: 102 MMOL/L — SIGNIFICANT CHANGE UP (ref 96–108)
CO2 SERPL-SCNC: 25 MMOL/L — SIGNIFICANT CHANGE UP (ref 22–31)
CREAT SERPL-MCNC: 0.85 MG/DL — SIGNIFICANT CHANGE UP (ref 0.5–1.3)
EOSINOPHIL # BLD AUTO: 0.22 K/UL — SIGNIFICANT CHANGE UP (ref 0–0.5)
EOSINOPHIL NFR BLD AUTO: 3.7 % — SIGNIFICANT CHANGE UP (ref 0–6)
GLUCOSE SERPL-MCNC: 79 MG/DL — SIGNIFICANT CHANGE UP (ref 70–99)
HCT VFR BLD CALC: 42.2 % — SIGNIFICANT CHANGE UP (ref 39–50)
HGB BLD-MCNC: 14.8 G/DL — SIGNIFICANT CHANGE UP (ref 13–17)
IMM GRANULOCYTES NFR BLD AUTO: 0.2 % — SIGNIFICANT CHANGE UP (ref 0–1.5)
LYMPHOCYTES # BLD AUTO: 2.47 K/UL — SIGNIFICANT CHANGE UP (ref 1–3.3)
LYMPHOCYTES # BLD AUTO: 42 % — SIGNIFICANT CHANGE UP (ref 13–44)
MCHC RBC-ENTMCNC: 35.1 GM/DL — SIGNIFICANT CHANGE UP (ref 32–36)
MCHC RBC-ENTMCNC: 35.7 PG — HIGH (ref 27–34)
MCV RBC AUTO: 101.9 FL — HIGH (ref 80–100)
MONOCYTES # BLD AUTO: 0.6 K/UL — SIGNIFICANT CHANGE UP (ref 0–0.9)
MONOCYTES NFR BLD AUTO: 10.2 % — SIGNIFICANT CHANGE UP (ref 2–14)
NEUTROPHILS # BLD AUTO: 2.5 K/UL — SIGNIFICANT CHANGE UP (ref 1.8–7.4)
NEUTROPHILS NFR BLD AUTO: 42.5 % — LOW (ref 43–77)
NRBC # BLD: 0 /100 WBCS — SIGNIFICANT CHANGE UP (ref 0–0)
PLATELET # BLD AUTO: 339 K/UL — SIGNIFICANT CHANGE UP (ref 150–400)
POTASSIUM SERPL-MCNC: 4.4 MMOL/L — SIGNIFICANT CHANGE UP (ref 3.5–5.3)
POTASSIUM SERPL-SCNC: 4.4 MMOL/L — SIGNIFICANT CHANGE UP (ref 3.5–5.3)
PROT SERPL-MCNC: 7.6 G/DL — SIGNIFICANT CHANGE UP (ref 6–8.3)
RBC # BLD: 4.14 M/UL — LOW (ref 4.2–5.8)
RBC # FLD: 12.8 % — SIGNIFICANT CHANGE UP (ref 10.3–14.5)
SODIUM SERPL-SCNC: 139 MMOL/L — SIGNIFICANT CHANGE UP (ref 135–145)
WBC # BLD: 5.88 K/UL — SIGNIFICANT CHANGE UP (ref 3.8–10.5)
WBC # FLD AUTO: 5.88 K/UL — SIGNIFICANT CHANGE UP (ref 3.8–10.5)

## 2021-12-21 PROCEDURE — 99495 TRANSJ CARE MGMT MOD F2F 14D: CPT | Mod: GC

## 2021-12-22 LAB
4/8 RATIO: 3.24 RATIO — SIGNIFICANT CHANGE UP (ref 0.9–3.6)
ABS CD8: 387 /UL — SIGNIFICANT CHANGE UP (ref 142–740)
CD3 BLASTS SPEC-ACNC: 1667 /UL — SIGNIFICANT CHANGE UP (ref 672–1870)
CD3 BLASTS SPEC-ACNC: 76 % — SIGNIFICANT CHANGE UP (ref 59–83)
CD4 %: 57 % — SIGNIFICANT CHANGE UP (ref 30–62)
CD8 %: 18 % — SIGNIFICANT CHANGE UP (ref 12–36)
T PALLIDUM AB TITR SER: NEGATIVE — SIGNIFICANT CHANGE UP
T-CELL CD4 SUBSET PNL BLD: 1254 /UL — SIGNIFICANT CHANGE UP (ref 489–1457)

## 2021-12-23 LAB
HIV-1 VIRAL LOAD RESULT: SIGNIFICANT CHANGE UP
HIV1 RNA # SERPL NAA+PROBE: SIGNIFICANT CHANGE UP COPIES/ML
HIV1 RNA SER-IMP: SIGNIFICANT CHANGE UP
HIV1 RNA SERPL NAA+PROBE-ACNC: SIGNIFICANT CHANGE UP
HIV1 RNA SERPL NAA+PROBE-LOG#: SIGNIFICANT CHANGE UP LG COP/ML

## 2021-12-24 LAB — OB PNL STL IA: NEGATIVE — SIGNIFICANT CHANGE UP

## 2021-12-28 DIAGNOSIS — F10.10 ALCOHOL ABUSE, UNCOMPLICATED: ICD-10-CM

## 2021-12-28 DIAGNOSIS — Z79.899 OTHER LONG TERM (CURRENT) DRUG THERAPY: ICD-10-CM

## 2022-03-11 ENCOUNTER — NON-APPOINTMENT (OUTPATIENT)
Age: 47
End: 2022-03-11

## 2022-03-11 ENCOUNTER — RX RENEWAL (OUTPATIENT)
Age: 47
End: 2022-03-11

## 2022-03-21 ENCOUNTER — NON-APPOINTMENT (OUTPATIENT)
Age: 47
End: 2022-03-21

## 2022-03-22 ENCOUNTER — APPOINTMENT (OUTPATIENT)
Dept: INFECTIOUS DISEASE | Facility: CLINIC | Age: 47
End: 2022-03-22

## 2022-04-05 ENCOUNTER — OUTPATIENT (OUTPATIENT)
Dept: OUTPATIENT SERVICES | Facility: HOSPITAL | Age: 47
LOS: 1 days | End: 2022-04-05

## 2022-04-05 ENCOUNTER — APPOINTMENT (OUTPATIENT)
Dept: INFECTIOUS DISEASE | Facility: CLINIC | Age: 47
End: 2022-04-05
Payer: MEDICAID

## 2022-04-05 VITALS
OXYGEN SATURATION: 96 % | RESPIRATION RATE: 14 BRPM | SYSTOLIC BLOOD PRESSURE: 106 MMHG | BODY MASS INDEX: 19.64 KG/M2 | HEIGHT: 72 IN | WEIGHT: 145 LBS | HEART RATE: 82 BPM | DIASTOLIC BLOOD PRESSURE: 72 MMHG

## 2022-04-05 DIAGNOSIS — F41.9 ANXIETY DISORDER, UNSPECIFIED: ICD-10-CM

## 2022-04-05 DIAGNOSIS — F32.A ANXIETY DISORDER, UNSPECIFIED: ICD-10-CM

## 2022-04-05 DIAGNOSIS — Z23 ENCOUNTER FOR IMMUNIZATION: ICD-10-CM

## 2022-04-05 LAB — NONINV COLON CA DNA+OCC BLD SCRN STL QL: NEGATIVE

## 2022-04-05 PROCEDURE — 99215 OFFICE O/P EST HI 40 MIN: CPT | Mod: GC

## 2022-04-05 RX ORDER — FERROUS SULFATE 325(65) MG
325 (65 FE) TABLET ORAL
Refills: 0 | Status: COMPLETED | COMMUNITY
Start: 2021-06-10 | End: 2022-04-05

## 2022-04-05 NOTE — PHYSICAL EXAM
[No Lymphadenopathy] : no lymphadenopathy [Supple] : supple [No Abdominal Bruit] : a ~M bruit was not heard ~T in the abdomen [No Edema] : there was no peripheral edema [No Extremity Clubbing/Cyanosis] : no extremity clubbing/cyanosis [Normal] : soft, non-tender, non-distended, no masses palpated, no HSM and normal bowel sounds [No Spinal Tenderness] : no spinal tenderness [No Focal Deficits] : no focal deficits [Normal Insight/Judgement] : insight and judgment were intact

## 2022-04-06 PROBLEM — Z23 ENCOUNTER FOR IMMUNIZATION: Status: ACTIVE | Noted: 2021-10-12

## 2022-04-06 NOTE — ASSESSMENT
[FreeTextEntry1] : #Anxiety & Depression - patient w/ known hx of anxiety, now meeting criteria for moderate/severe depression. Patient not utilizing therapy via detox program but is amenable to initiating therapy. Given severity of symptoms and failure to improve w/o medication patient is amenable to SSRI therapy as well. \par - start lexapro 5mg PO qd - patient counseled re side effects and need to maintain therapy for at least 6 weeks before notable improvement in depressive symptoms will be appreciated\par - referral to psychiatry\par \par #EtOH abuse - On naltrexone via [facility] program. patient reports drinkning twice since last discahrge in Dec, reports to be performing well on naltrexone (last drink 2 weeks ago). AST/ALT 27/27, plt WNL, \par - c/w Naltrexone PO per o/p \par - c/w outpatient rehab and counseling \par - Pt signed HIPPA paperwork to be able to get the medical record from [facility] during last visit, however, our office has not received any such labwork. \par - HBV vaccinated (sAb +), HCV negative 06/2021\par - administer HAV today\par \par #Neuropathy - presented to [facility] with severe foot drop which improved s/p B12 + folate + thiamine administration. Patient B12 & folate levels WNL however will continue medications i/s/o improvement of neuropathy. \par - c/w thiamine, folate, & B12\par \par #MSM on PrEP - Patient has had 1 sexual partner in the last 6 months always uses condoms. POCT HIV test negative in office today 4/5/2022 \par - c/w Descovy - patient is a good candidate for IM depot formulation but would rath not undergo an injection preferring to remain on PO medication\par - RTC in 3 months for further testing\par - Previous triple site test and syphilis was negative, hepatitis panel negative 12/2021\par \par #Essential tremor\par -C/w Propranolol 20mg qd\par \par #Pressure ulcer of foot - present on the lateral aspect of the L foot first digit w/ significant granulation tissue (2cm in diameter). R foot first digit newer lesion 3cm in diameter serosanguinous drainage. Patient sees podiatrist regularly, ulcers thought to be 2/2 pressure associated to foot carry while ambulating. Upcoming appt this week. Unlikely diabetic in nature given patient last A1c 6/2021 5.3 w/o symptoms of increased thirst or urinary frequency. \par - c/t monitor\par \par #tachycardia - RESOLVED\par \par #HCM\par - Pt did FIT test NEGATIVE 11/24/2021\par - Pt received Tdap 12/21/2021\par - HCV negative 06/2021 (should test annually given alcohol use) \par - received HAV today\par - COVID vaccine pFizer 4/19/21, 5/10/21, 11/19/21 (45yo does not qualify for fourth dose) \par \par RTC in 3 months. \par

## 2022-04-06 NOTE — HISTORY OF PRESENT ILLNESS
[FreeTextEntry1] : f/u for PrEP [de-identified] : Pt is 45 yo MSM PMHx of alcohol abuse disorder, asthma, essential hand tremor since childhood, recent admissions at [facility] on September and also on December for alcohol withdrawal (12/11/2021- 12/13/2021) discharged on Librium 10mg and nicotine 7mg/24hrs at the time, p/f f/u i/s/o PrEP therapy. He denies HA, dizziness, nausea, vomiting, skin tactile, vision/auditory hallucination, urinary, abdominal problem, SOB, chest pain. He reports mild b/l hand tremors, which is chronic and his baseline improve with albuterol. On naltrexone via [facility] program. patient reports drinkning twice since last discahrge in Dec, reports to be performing well on naltrexone (last drink 2 weeks ago). Patient lives in apartment with nephew, reports to feel safe in home. Works as a reality TV director, able to perform all ADLs. Patient is a current smoker (1 pack per day) 22 pack/yr Hx. Patient has had 1 sexual partner in the last 6 months always uses condoms.

## 2022-04-06 NOTE — END OF VISIT
[] : Resident [FreeTextEntry3] : I saw and evaluated the patient with resident. I reviewed the laboratory and radiologic data and reviewed the notes. \par I performed a medication reconciliation and  reviewed vaccination history and other health care maintenance and prevention topics in the EMR.\par I assessed patient's adherence to medications especially PrEP ( % 100 ) and inquired about AEs ( None).\par \par Start Lexapro- CSW visit today to evaluate MH needs\par POC HIV test done negative\par No need for STI testing today as has been low risk\par RTC in 3 months\par Injectable PrEP discussed- not interested at this point\par Hep A vaccination series\par \par

## 2022-04-06 NOTE — HEALTH RISK ASSESSMENT
[Current] : Current [20 or more] : 20 or more [Yes] : Yes [Monthly or less (1 pt)] : Monthly or less (1 point) [0] : 1) Little interest or pleasure doing things: Not at all (0) [3] : 2) Feeling down, depressed, or hopeless for nearly every day (3) [PHQ-2 Positive] : PHQ-2 Positive [Nearly Every Day (3)] : 5.) Poor appetite or overeating? Nearly every day [1/2 of Days or More (2)] : 7.) Trouble concentrating on things, such as reading a newspaper or watching television? Half the days or more [Not at All (0)] : 8.) Moving or speaking so slowly that other people could have noticed, or the opposite, moving or speaking faster than usual? Not at all [Moderately Severe] : severity of depression is moderately severe [Somewhat Difficult] : How difficult have these problems made it for you to do your work, take care of things at home, or get along with people? Somewhat difficult [[id number]] : 3

## 2022-04-07 DIAGNOSIS — F10.10 ALCOHOL ABUSE, UNCOMPLICATED: ICD-10-CM

## 2022-04-07 DIAGNOSIS — F41.9 ANXIETY DISORDER, UNSPECIFIED: ICD-10-CM

## 2022-04-07 DIAGNOSIS — F32.A DEPRESSION, UNSPECIFIED: ICD-10-CM

## 2022-04-07 DIAGNOSIS — Z79.899 OTHER LONG TERM (CURRENT) DRUG THERAPY: ICD-10-CM

## 2022-04-07 DIAGNOSIS — Z23 ENCOUNTER FOR IMMUNIZATION: ICD-10-CM

## 2022-05-06 ENCOUNTER — RX RENEWAL (OUTPATIENT)
Age: 47
End: 2022-05-06

## 2022-05-06 RX ORDER — ALBUTEROL SULFATE 90 UG/1
108 (90 BASE) INHALANT RESPIRATORY (INHALATION)
Qty: 1 | Refills: 2 | Status: ACTIVE | COMMUNITY
Start: 2021-06-16

## 2022-05-06 RX ORDER — ERGOCALCIFEROL (VITAMIN D2) 10 MCG
TABLET ORAL
Qty: 30 | Refills: 5 | Status: ACTIVE | COMMUNITY
Start: 2021-06-10

## 2022-06-28 ENCOUNTER — RX RENEWAL (OUTPATIENT)
Age: 47
End: 2022-06-28

## 2022-07-07 ENCOUNTER — APPOINTMENT (OUTPATIENT)
Dept: INFECTIOUS DISEASE | Facility: CLINIC | Age: 47
End: 2022-07-07

## 2022-07-07 ENCOUNTER — OUTPATIENT (OUTPATIENT)
Dept: OUTPATIENT SERVICES | Facility: HOSPITAL | Age: 47
LOS: 1 days | End: 2022-07-07

## 2022-07-07 ENCOUNTER — RX RENEWAL (OUTPATIENT)
Age: 47
End: 2022-07-07

## 2022-07-07 VITALS
BODY MASS INDEX: 18.99 KG/M2 | HEART RATE: 70 BPM | WEIGHT: 140 LBS | RESPIRATION RATE: 14 BRPM | DIASTOLIC BLOOD PRESSURE: 80 MMHG | SYSTOLIC BLOOD PRESSURE: 120 MMHG

## 2022-07-07 DIAGNOSIS — F10.10 ALCOHOL ABUSE, UNCOMPLICATED: ICD-10-CM

## 2022-07-07 DIAGNOSIS — Z72.52 HIGH RISK HOMOSEXUAL BEHAVIOR: ICD-10-CM

## 2022-07-07 DIAGNOSIS — G62.9 POLYNEUROPATHY, UNSPECIFIED: ICD-10-CM

## 2022-07-07 DIAGNOSIS — M21.611 BUNION OF RIGHT FOOT: ICD-10-CM

## 2022-07-07 DIAGNOSIS — Z79.899 OTHER LONG TERM (CURRENT) DRUG THERAPY: ICD-10-CM

## 2022-07-07 LAB
24R-OH-CALCIDIOL SERPL-MCNC: 35.1 NG/ML — SIGNIFICANT CHANGE UP (ref 30–80)
ALBUMIN SERPL ELPH-MCNC: 3.8 G/DL — SIGNIFICANT CHANGE UP (ref 3.3–5)
ALP SERPL-CCNC: 259 U/L — HIGH (ref 40–120)
ALT FLD-CCNC: 88 U/L — HIGH (ref 10–45)
ANION GAP SERPL CALC-SCNC: 18 MMOL/L — HIGH (ref 5–17)
ANISOCYTOSIS BLD QL: SLIGHT — SIGNIFICANT CHANGE UP
APTT BLD: 32.7 SEC — SIGNIFICANT CHANGE UP (ref 27.5–35.5)
AST SERPL-CCNC: 210 U/L — HIGH (ref 10–40)
BASOPHILS # BLD AUTO: 0.16 K/UL — SIGNIFICANT CHANGE UP (ref 0–0.2)
BASOPHILS NFR BLD AUTO: 2.6 % — HIGH (ref 0–2)
BILIRUB SERPL-MCNC: 0.8 MG/DL — SIGNIFICANT CHANGE UP (ref 0.2–1.2)
BUN SERPL-MCNC: 13 MG/DL — SIGNIFICANT CHANGE UP (ref 7–23)
CALCIUM SERPL-MCNC: 8.7 MG/DL — SIGNIFICANT CHANGE UP (ref 8.4–10.5)
CHLORIDE SERPL-SCNC: 101 MMOL/L — SIGNIFICANT CHANGE UP (ref 96–108)
CO2 SERPL-SCNC: 24 MMOL/L — SIGNIFICANT CHANGE UP (ref 22–31)
CONTROL LINE: PRESENT
CREAT SERPL-MCNC: 0.52 MG/DL — SIGNIFICANT CHANGE UP (ref 0.5–1.3)
DACRYOCYTES BLD QL SMEAR: SLIGHT — SIGNIFICANT CHANGE UP
EGFR: 124 ML/MIN/1.73M2 — SIGNIFICANT CHANGE UP
EOSINOPHIL # BLD AUTO: 0.16 K/UL — SIGNIFICANT CHANGE UP (ref 0–0.5)
EOSINOPHIL NFR BLD AUTO: 2.6 % — SIGNIFICANT CHANGE UP (ref 0–6)
FOLATE SERPL-MCNC: >20 NG/ML — SIGNIFICANT CHANGE UP
GIANT PLATELETS BLD QL SMEAR: PRESENT — SIGNIFICANT CHANGE UP
GLUCOSE SERPL-MCNC: 94 MG/DL — SIGNIFICANT CHANGE UP (ref 70–99)
HCT VFR BLD CALC: 41.7 % — SIGNIFICANT CHANGE UP (ref 39–50)
HGB BLD-MCNC: 14.6 G/DL — SIGNIFICANT CHANGE UP (ref 13–17)
HIV 1+2 AB+HIV1P24 AG SERPLBLD IA.RAPID: NEGATIVE
HIV-1 / HIV-2 ANTIBODY: NORMAL
HIV1 P24 AG SERPL IA-MCNC: NORMAL
INR BLD: 0.93 — SIGNIFICANT CHANGE UP (ref 0.88–1.16)
LYMPHOCYTES # BLD AUTO: 3.11 K/UL — SIGNIFICANT CHANGE UP (ref 1–3.3)
LYMPHOCYTES # BLD AUTO: 51.3 % — HIGH (ref 13–44)
MACROCYTES BLD QL: SLIGHT — SIGNIFICANT CHANGE UP
MANUAL SMEAR VERIFICATION: SIGNIFICANT CHANGE UP
MCHC RBC-ENTMCNC: 35 GM/DL — SIGNIFICANT CHANGE UP (ref 32–36)
MCHC RBC-ENTMCNC: 37.5 PG — HIGH (ref 27–34)
MCV RBC AUTO: 107.2 FL — HIGH (ref 80–100)
MONOCYTES # BLD AUTO: 0.42 K/UL — SIGNIFICANT CHANGE UP (ref 0–0.9)
MONOCYTES NFR BLD AUTO: 7 % — SIGNIFICANT CHANGE UP (ref 2–14)
NEUTROPHILS # BLD AUTO: 2.21 K/UL — SIGNIFICANT CHANGE UP (ref 1.8–7.4)
NEUTROPHILS NFR BLD AUTO: 36.5 % — LOW (ref 43–77)
OVALOCYTES BLD QL SMEAR: SLIGHT — SIGNIFICANT CHANGE UP
PLAT MORPH BLD: ABNORMAL
PLATELET # BLD AUTO: 209 K/UL — SIGNIFICANT CHANGE UP (ref 150–400)
POLYCHROMASIA BLD QL SMEAR: SLIGHT — SIGNIFICANT CHANGE UP
POTASSIUM SERPL-MCNC: 3.8 MMOL/L — SIGNIFICANT CHANGE UP (ref 3.5–5.3)
POTASSIUM SERPL-SCNC: 3.8 MMOL/L — SIGNIFICANT CHANGE UP (ref 3.5–5.3)
PROT SERPL-MCNC: 6.9 G/DL — SIGNIFICANT CHANGE UP (ref 6–8.3)
PROTHROM AB SERPL-ACNC: 11 SEC — SIGNIFICANT CHANGE UP (ref 10.5–13.4)
RBC # BLD: 3.89 M/UL — LOW (ref 4.2–5.8)
RBC # FLD: 13.2 % — SIGNIFICANT CHANGE UP (ref 10.3–14.5)
RBC BLD AUTO: ABNORMAL
SMUDGE CELLS # BLD: PRESENT — SIGNIFICANT CHANGE UP
SODIUM SERPL-SCNC: 143 MMOL/L — SIGNIFICANT CHANGE UP (ref 135–145)
SPHEROCYTES BLD QL SMEAR: SLIGHT — SIGNIFICANT CHANGE UP
VIT B12 SERPL-MCNC: >2000 PG/ML — HIGH (ref 232–1245)
WBC # BLD: 6.06 K/UL — SIGNIFICANT CHANGE UP (ref 3.8–10.5)
WBC # FLD AUTO: 6.06 K/UL — SIGNIFICANT CHANGE UP (ref 3.8–10.5)

## 2022-07-07 PROCEDURE — 99396 PREV VISIT EST AGE 40-64: CPT | Mod: GC

## 2022-07-07 RX ORDER — PROPRANOLOL HYDROCHLORIDE 10 MG/1
10 TABLET ORAL TWICE DAILY
Qty: 60 | Refills: 1 | Status: ACTIVE | COMMUNITY
Start: 2021-06-10 | End: 1900-01-01

## 2022-07-07 RX ORDER — ESCITALOPRAM OXALATE 5 MG/1
5 TABLET ORAL DAILY
Qty: 30 | Refills: 3 | Status: ACTIVE | COMMUNITY
Start: 2022-04-05 | End: 1900-01-01

## 2022-07-08 DIAGNOSIS — Z72.52 HIGH RISK HOMOSEXUAL BEHAVIOR: ICD-10-CM

## 2022-07-08 DIAGNOSIS — Z00.00 ENCOUNTER FOR GENERAL ADULT MEDICAL EXAMINATION WITHOUT ABNORMAL FINDINGS: ICD-10-CM

## 2022-07-08 DIAGNOSIS — Z79.899 OTHER LONG TERM (CURRENT) DRUG THERAPY: ICD-10-CM

## 2022-07-08 DIAGNOSIS — F10.10 ALCOHOL ABUSE, UNCOMPLICATED: ICD-10-CM

## 2022-07-08 DIAGNOSIS — G62.9 POLYNEUROPATHY, UNSPECIFIED: ICD-10-CM

## 2022-07-08 DIAGNOSIS — M21.611 BUNION OF RIGHT FOOT: ICD-10-CM

## 2022-07-08 RX ORDER — EMTRICITABINE AND TENOFOVIR ALAFENAMIDE 200; 25 MG/1; MG/1
200-25 TABLET ORAL
Qty: 30 | Refills: 2 | Status: ACTIVE | COMMUNITY
Start: 2021-06-10

## 2022-07-08 RX ORDER — FOLIC ACID 1 MG/1
1 TABLET ORAL DAILY
Qty: 30 | Refills: 5 | Status: ACTIVE | COMMUNITY
Start: 2021-06-10

## 2022-07-08 NOTE — PHYSICAL EXAM
[No Acute Distress] : no acute distress [Well Nourished] : well nourished [Well Developed] : well developed [Well-Appearing] : well-appearing [Normal Sclera/Conjunctiva] : normal sclera/conjunctiva [PERRL] : pupils equal round and reactive to light [EOMI] : extraocular movements intact [Normal Outer Ear/Nose] : the outer ears and nose were normal in appearance [Normal Oropharynx] : the oropharynx was normal [No JVD] : no jugular venous distention [No Lymphadenopathy] : no lymphadenopathy [Supple] : supple [Thyroid Normal, No Nodules] : the thyroid was normal and there were no nodules present [No Respiratory Distress] : no respiratory distress  [No Accessory Muscle Use] : no accessory muscle use [Clear to Auscultation] : lungs were clear to auscultation bilaterally [Normal Rate] : normal rate  [Regular Rhythm] : with a regular rhythm [Normal S1, S2] : normal S1 and S2 [No Murmur] : no murmur heard [No Carotid Bruits] : no carotid bruits [No Abdominal Bruit] : a ~M bruit was not heard ~T in the abdomen [No Varicosities] : no varicosities [Pedal Pulses Present] : the pedal pulses are present [No Edema] : there was no peripheral edema [No Palpable Aorta] : no palpable aorta [No Extremity Clubbing/Cyanosis] : no extremity clubbing/cyanosis [Soft] : abdomen soft [Non Tender] : non-tender [Non-distended] : non-distended [No Masses] : no abdominal mass palpated [No HSM] : no HSM [Normal Bowel Sounds] : normal bowel sounds [Normal Posterior Cervical Nodes] : no posterior cervical lymphadenopathy [Normal Anterior Cervical Nodes] : no anterior cervical lymphadenopathy [No CVA Tenderness] : no CVA  tenderness [No Spinal Tenderness] : no spinal tenderness [No Joint Swelling] : no joint swelling [Grossly Normal Strength/Tone] : grossly normal strength/tone [No Rash] : no rash [Coordination Grossly Intact] : coordination grossly intact [No Focal Deficits] : no focal deficits [Deep Tendon Reflexes (DTR)] : deep tendon reflexes were 2+ and symmetric [Normal Affect] : the affect was normal [de-identified] : R toe with lesion, wrapped in gauze [de-identified] : Numbness in b/l feet [de-identified] : Tearful with questioning. endorses depression. Poor balance, appears inebriated, but denies alcohol use since 7/4

## 2022-07-08 NOTE — ASSESSMENT
[FreeTextEntry1] : 46 year old male, MSM on descovy for Prep, alcohol use disorder (Inpatient admission at [facility] 12/21 for etoh w/d, now on Naltrexone), depression (on lexapro), hand tremor (on propanolol), asthma, neuropathy and multiple foot lesions.

## 2022-07-08 NOTE — END OF VISIT
[FreeTextEntry3] : I saw and evaluated the patient with resident. I reviewed the laboratory and radiologic data and reviewed the notes. \par I performed a medication reconciliation and  reviewed vaccination history and other health care maintenance and prevention topics in the EMR.\par I assessed patient's adherence to medications especially PrEP ( % 100 ) and inquired about AEs ( None).\par \par Has cut down but continues alcohol use\par Continues the rehab program and Naltrexone pills \par \par Preop eval done- time of surgery not set \par \par Optimal for foot surgery\par \par \par \par  [] : Resident

## 2022-07-08 NOTE — HISTORY OF PRESENT ILLNESS
[FreeTextEntry1] : Pre op for podiatric surgery\par follow up for PrEP and primary care [de-identified] : 46 year old male, MSM on descovy for Prep, alcohol use disorder (Inpatient admission at [facility] 12/21 for etoh w/d, now on Naltrexone), depression (on lexapro), hand tremor (on propanolol), asthma, neuropathy and multiple foot lesions. Patient is here for comprehensive visit and pre-op for podiatry surgery. In regards to his alcohol use and depression, he sees a counselor at [facility] who prescribes Naltrexone, which he is taking, but he says he is drinking anyway. He reports he is going to try to stop again after the foot surgery. He is feeling depressed and is currently unemployed but he denies SI/HI and is taking his lexapro and seeing his counselor. He says he hasn't had sex in over 6 months, but wants to be tested for HIV just in case. Both of his feet have ongoing numbness/tingling and his right foot has a lesion on the big toe, for which he is seeing a podiatry and surgery is planned.

## 2022-07-08 NOTE — HEALTH RISK ASSESSMENT
[Good] : ~his/her~ current health as good [Fair] :  ~his/her~ mood as fair [Current] : Current [15-19] : 15-19 [Yes] : Yes [4 or more  times a week (4 pts)] : 4 or more  times a week (4 points) [3 or 4 (1 pt)] : 3 or 4  (1 point) [Less than monthly (1 pt)] : Less than monthly (1 point) [No falls in past year] : Patient reported no falls in the past year [PHQ-2 Positive] : PHQ-2 Positive [PHQ-9 Positive] : PHQ-9 Positive [I have developed a follow-up plan documented below in the note.] : I have developed a follow-up plan documented below in the note. [HIV Test offered] : HIV Test offered [Alone] : lives alone [Unemployed] : unemployed [Single] : single [Feels Safe at Home] : Feels safe at home [Fully functional (bathing, dressing, toileting, transferring, walking, feeding)] : Fully functional (bathing, dressing, toileting, transferring, walking, feeding) [Fully functional (using the telephone, shopping, preparing meals, housekeeping, doing laundry, using] : Fully functional and needs no help or supervision to perform IADLs (using the telephone, shopping, preparing meals, housekeeping, doing laundry, using transportation, managing medications and managing finances) [de-identified] : Poor [Sexually Active] : not sexually active [High Risk Behavior] : no high risk behavior [Reports changes in hearing] : Reports no changes in hearing [Reports changes in vision] : Reports no changes in vision [Reports changes in dental health] : Reports no changes in dental health [de-identified] : Not currently

## 2022-07-08 NOTE — REVIEW OF SYSTEMS
[Unsteady Walk] : ataxia [Anxiety] : anxiety [Depression] : depression [Negative] : Heme/Lymph [Suicidal] : not suicidal [Insomnia] : no insomnia [de-identified] : Right big toe with lesion, wrapped in gauze. [de-identified] : Neuropathy is b/l feet

## 2022-07-08 NOTE — PLAN
[FreeTextEntry1] : \par \par #Pre-Op for podiatry surgery - left bunyon and right lesion on big toe \par -RCRI - 0 points - 3.9% risk of death, MI, or cardiac arrest\par -Garcia - 0.1% risk of Mi/cardiac arrest of up to 30 days post-op\par -Low risk for low risk surgery\par -f/u CBC, CMP, PT/PTT today\par \par #PREP for MSM\par -c/w [place] for Prep\par -POC HIV test negative today\par -No need for further STD testing since no sexual activity for past 6 months\par \par #Alcohol use and depression\par -Tearful on exam today and appeared inebriated although denied alcohol use since 7/4\par -PHQ-9 was 9 and JONO was 9\par -Sees counselor and prescribed Naltrexone by Dr. Luther at [facility]\par -c/w lexapro - refilled today\par -Encouraged to go back to AA meetings\par -c/w thiamine, B12, folic acid\par \par #Macrocytosis\par Likely 2/2 alcohol use\par Repeat CBC today\par -c/w folate, thiamine, B12\par \par #Hand tremor\par -c/w propanolol - refilled today\par \par #HCM\par -RTC 3 months\par -Due for annual hep C screening - do next visit

## 2022-07-11 ENCOUNTER — NON-APPOINTMENT (OUTPATIENT)
Age: 47
End: 2022-07-11